# Patient Record
Sex: FEMALE | Race: WHITE | HISPANIC OR LATINO | URBAN - METROPOLITAN AREA
[De-identification: names, ages, dates, MRNs, and addresses within clinical notes are randomized per-mention and may not be internally consistent; named-entity substitution may affect disease eponyms.]

---

## 2017-08-03 ENCOUNTER — OFFICE (OUTPATIENT)
Dept: URBAN - METROPOLITAN AREA CLINIC 78 | Facility: CLINIC | Age: 57
End: 2017-08-03
Payer: COMMERCIAL

## 2017-08-03 VITALS
HEIGHT: 59 IN | SYSTOLIC BLOOD PRESSURE: 108 MMHG | TEMPERATURE: 99.3 F | WEIGHT: 133 LBS | DIASTOLIC BLOOD PRESSURE: 74 MMHG | HEART RATE: 95 BPM

## 2017-08-03 DIAGNOSIS — K21.9 GASTRO-ESOPHAGEAL REFLUX DISEASE WITHOUT ESOPHAGITIS: ICD-10-CM

## 2017-08-03 DIAGNOSIS — K31.84 GASTROPARESIS: ICD-10-CM

## 2017-08-03 PROCEDURE — 99213 OFFICE O/P EST LOW 20 MIN: CPT

## 2017-08-03 RX ORDER — ESOMEPRAZOLE MAGNESIUM 40 MG/1
CAPSULE, DELAYED RELEASE ORAL
Qty: 180 | Refills: 3 | Status: COMPLETED
End: 2019-07-31

## 2017-08-03 NOTE — SERVICEHPINOTES
58 yo female presents for f/u GERD and gastroparesis. Here for refill of her Nexium. She is s/p antireflux surgery several years ago with complications and since that time has had gastroparesis and severe GERD. She reports that she has no LES function and that if she doesn't stay upright after eating/drinking she will have regurg and aspiration. She uses Nexium BID and Zantac qhs though found that Zantac seemed to make her cough so is wondering about another option. She does note that she has joined [employer] and is losing some weight and subsequently has had improvement in GERD symptoms. Last year she had positive Cologuard and then completed an EGD/colonoscopy with overall benign findings. Next colonoscopy was advised for 5 years. She actually did not remember today that she had the colonoscopy - had previously wanted to avoid this for fear of prep as well as aspiration risk.

## 2018-05-25 ENCOUNTER — OFFICE (OUTPATIENT)
Dept: URBAN - METROPOLITAN AREA CLINIC 78 | Facility: CLINIC | Age: 58
End: 2018-05-25
Payer: COMMERCIAL

## 2018-05-25 VITALS
HEIGHT: 59 IN | HEART RATE: 87 BPM | SYSTOLIC BLOOD PRESSURE: 130 MMHG | TEMPERATURE: 98.2 F | DIASTOLIC BLOOD PRESSURE: 98 MMHG | WEIGHT: 131 LBS

## 2018-05-25 DIAGNOSIS — K31.84 GASTROPARESIS: ICD-10-CM

## 2018-05-25 DIAGNOSIS — K21.9 GASTRO-ESOPHAGEAL REFLUX DISEASE WITHOUT ESOPHAGITIS: ICD-10-CM

## 2018-05-25 DIAGNOSIS — R19.6 HALITOSIS: ICD-10-CM

## 2018-05-25 PROCEDURE — 99214 OFFICE O/P EST MOD 30 MIN: CPT

## 2018-05-25 NOTE — SERVICEHPINOTES
57 yo female with h/o GERD and gastroparesis presents with complaint of intermittent bad breath. She started noticing it 2-3 months ago. She denies change in dental health though just saw her dentist today to get a filling replaced. Will be going back again next week and will mention the bad breath issue. She has been eating healthier diet and reports improved sleep. Denies any change in GI symptoms, has been doing well overall, though does have gas issues. Patient is s/p antireflux surgery several years ago with complications and since that time has had gastroparesis and severe GERD. She reports that she has no LES function and that if she doesn't stay upright after eating/drinking she will have regurg and aspiration. She uses Nexium BID and Zantac qhs. She is having regular BM with use of a probiotic. In 2016 she had positive Cologuard and then completed an EGD/colonoscopy with overall benign findings. Next colonoscopy was advised for 5 years.

## 2019-01-30 ENCOUNTER — OFFICE (OUTPATIENT)
Dept: URBAN - METROPOLITAN AREA CLINIC 78 | Facility: CLINIC | Age: 59
End: 2019-01-30

## 2019-01-30 VITALS
HEIGHT: 59 IN | TEMPERATURE: 97.9 F | HEART RATE: 73 BPM | WEIGHT: 143 LBS | SYSTOLIC BLOOD PRESSURE: 116 MMHG | DIASTOLIC BLOOD PRESSURE: 79 MMHG

## 2019-01-30 DIAGNOSIS — K31.84 GASTROPARESIS: ICD-10-CM

## 2019-01-30 DIAGNOSIS — K21.9 GASTRO-ESOPHAGEAL REFLUX DISEASE WITHOUT ESOPHAGITIS: ICD-10-CM

## 2019-01-30 PROCEDURE — 99213 OFFICE O/P EST LOW 20 MIN: CPT

## 2019-01-30 NOTE — SERVICEHPINOTES
57 yo female presents for f/u GERD and gastroparesis. She is s/p antireflux surgery several years ago with complications and since that time has had gastroparesis and severe GERD. She reports that she has no LES function and that if she doesn't stay upright after eating/drinking she will have regurg and aspiration. She uses Nexium BID and Zantac 300 mg qhs (though she switches this up sometimes as it seems to cause a cough). She notes that she is currently doing very well on this regimen but had recent insurance denial of the BID PPI dosing. A prior auth is underway. She has previously tried and failed other PPIs, including Dexilant. She has no other concerns today. Last EGD was in May 2016 showing mild esophagitis, mild gastritis, and small hiatal hernia. Duodenal and stomach biopsies were benign.  In 2016 she had positive Cologuard and then completed a colonoscopy with overall benign findings. Next colonoscopy was advised for 2021.

## 2019-07-31 ENCOUNTER — OFFICE (OUTPATIENT)
Dept: URBAN - METROPOLITAN AREA CLINIC 78 | Facility: CLINIC | Age: 59
End: 2019-07-31
Payer: COMMERCIAL

## 2019-07-31 VITALS
HEIGHT: 59 IN | TEMPERATURE: 98.5 F | DIASTOLIC BLOOD PRESSURE: 80 MMHG | HEART RATE: 75 BPM | SYSTOLIC BLOOD PRESSURE: 122 MMHG | WEIGHT: 149 LBS

## 2019-07-31 DIAGNOSIS — K21.9 GASTRO-ESOPHAGEAL REFLUX DISEASE WITHOUT ESOPHAGITIS: ICD-10-CM

## 2019-07-31 DIAGNOSIS — K31.84 GASTROPARESIS: ICD-10-CM

## 2019-07-31 PROCEDURE — 99214 OFFICE O/P EST MOD 30 MIN: CPT

## 2019-07-31 RX ORDER — BACLOFEN 10 MG/1
TABLET ORAL
Qty: 120 | Refills: 5 | Status: COMPLETED
End: 2020-04-01

## 2019-07-31 NOTE — SERVICEHPINOTES
58 yo female with h/o GERD and gastroparesis presents to discuss some medication concerns. She is s/p antireflux surgery several years ago with complications and since that time has had gastroparesis and severe GERD. She reports that she has no LES function and that if she doesn't stay upright after eating/drinking she will have regurg and aspiration. She uses Nexium BID and Zantac 300 mg qhs (though she switches this up sometimes as it seems to cause a cough). Has done well with symptom control on this regimen, but she notes recent foot fracture, very low vitamin D level, and anxiety issues that have not responded to multiple trials of psych meds. She is wondering if her acid-blocking meds might be contributing to some of these issues. She has previously tried and failed other PPIs, including Dexilant. She has no other concerns today. Last EGD was in May 2016 showing mild esophagitis, mild gastritis, and small hiatal hernia. Duodenal and stomach biopsies were benign. In 2016 she had positive Cologuard and then completed a colonoscopy with overall benign findings. Next colonoscopy was advised for 2021.

## 2020-04-01 ENCOUNTER — OFFICE (OUTPATIENT)
Dept: URBAN - METROPOLITAN AREA CLINIC 79 | Facility: CLINIC | Age: 60
End: 2020-04-01
Payer: COMMERCIAL

## 2020-04-01 VITALS — HEIGHT: 59 IN

## 2020-04-01 DIAGNOSIS — K21.9 GASTRO-ESOPHAGEAL REFLUX DISEASE WITHOUT ESOPHAGITIS: ICD-10-CM

## 2020-04-01 DIAGNOSIS — K31.84 GASTROPARESIS: ICD-10-CM

## 2020-04-01 PROCEDURE — 99214 OFFICE O/P EST MOD 30 MIN: CPT | Mod: GQ | Performed by: PHYSICIAN ASSISTANT

## 2020-04-01 RX ORDER — FAMOTIDINE 40 MG/1
TABLET, FILM COATED ORAL
Qty: 90 | Refills: 3 | Status: COMPLETED
Start: 2020-04-01 | End: 2020-08-20

## 2020-04-01 RX ORDER — ESOMEPRAZOLE MAGNESIUM 40 MG/1
CAPSULE, DELAYED RELEASE ORAL
Qty: 180 | Refills: 3 | Status: COMPLETED
End: 2020-11-25

## 2020-04-01 NOTE — SERVICEHPINOTES
PATIENT VERIFIED BY DATE OF BIRTH AND NAME. Patient has been consented for this telecommunication visit. 60 yo female with h/o GERD and gastroparesis presents for follow-up. She is s/p antireflux surgery several years ago with complications and since that time has had gastroparesis and severe GERD. She reports that she has no LES function and that if she doesn't stay upright after eating/drinking she will have regurg and aspiration. She uses Nexium 40 mg BID and Zantac 300 mg qhs. She had to stop Zantac due to the recall and hasn't found anything else to work as well. If she takes less medication, she gets heartburn and reflux. She is aware of goal of lowest effective dose and does have days with lower stress that she feels able to just use one Nexium. She has previously tried and failed other PPIs, including Dexilant. Last year she also tried baclofen but this ended up causing more vomiting for her. She has no other concerns today. Last EGD was in May 2016 showing mild esophagitis, mild gastritis, and small hiatal hernia. Duodenal and stomach biopsies were benign. In 2016 she had positive Cologuard and then completed a colonoscopy with overall benign findings. Next colonoscopy was advised for 2021.ROS today is positive only for reflux, and her chronic anxiety and depression. Otherwise negative.

## 2020-08-20 ENCOUNTER — OFFICE (OUTPATIENT)
Dept: URBAN - METROPOLITAN AREA TELEHEALTH 7 | Facility: TELEHEALTH | Age: 60
End: 2020-08-20
Payer: COMMERCIAL

## 2020-08-20 VITALS — HEIGHT: 59 IN | WEIGHT: 164 LBS

## 2020-08-20 DIAGNOSIS — K31.84 GASTROPARESIS: ICD-10-CM

## 2020-08-20 DIAGNOSIS — K21.9 GASTRO-ESOPHAGEAL REFLUX DISEASE WITHOUT ESOPHAGITIS: ICD-10-CM

## 2020-08-20 PROCEDURE — 99214 OFFICE O/P EST MOD 30 MIN: CPT | Mod: GQ | Performed by: PHYSICIAN ASSISTANT

## 2020-08-20 NOTE — SERVICEHPINOTES
PATIENT VERIFIED BY DATE OF BIRTH AND NAME. Patient has been consented for this telecommunication visit. 59 yo female with h/o GERD and gastroparesis presents with ongoing reflux and regurgitation symptoms. Her  is with her. She is s/p antireflux surgery several years ago with complications and since that time has had gastroparesis and severe GERD. She reports that she has no LES function and that if she doesn't stay upright after eating/drinking she will have regurg and aspiration. She recently has been having vomiting and has regurg happening at night. Can happen without much warning during the day as well. She uses Nexium 40 mg BID and Pepcid 40 mg qhs (previously on Zantac 300mg which worked better for her). Quality of life has been much worse in the past few months and she is unable to work right now due to symptoms and this is increasing financial stress for her. Has been struggling with anxiety and depression (seeing someone for that).   She has previously tried and failed other PPIs, including Dexilant. Last year she also tried baclofen but this ended up causing more vomiting for her. She has no other concerns today. Last EGD was in May 2016 showing mild esophagitis, mild gastritis, and small hiatal hernia. Duodenal and stomach biopsies were benign. In 2016 she had positive Cologuard and then completed a colonoscopy with overall benign findings. Next colonoscopy was advised for 2021. She reports regular bowel habits - no constipation.ROS as above, otherwise negative.

## 2020-11-25 ENCOUNTER — OFFICE (OUTPATIENT)
Dept: URBAN - METROPOLITAN AREA TELEHEALTH 3 | Facility: TELEHEALTH | Age: 60
End: 2020-11-25
Payer: COMMERCIAL

## 2020-11-25 VITALS — HEIGHT: 59 IN | WEIGHT: 116 LBS

## 2020-11-25 DIAGNOSIS — K59.09 OTHER CONSTIPATION: ICD-10-CM

## 2020-11-25 DIAGNOSIS — K21.9 GASTRO-ESOPHAGEAL REFLUX DISEASE WITHOUT ESOPHAGITIS: ICD-10-CM

## 2020-11-25 DIAGNOSIS — K31.84 GASTROPARESIS: ICD-10-CM

## 2020-11-25 PROCEDURE — 99214 OFFICE O/P EST MOD 30 MIN: CPT | Mod: GQ | Performed by: INTERNAL MEDICINE

## 2020-11-25 RX ORDER — PRUCALOPRIDE 1 MG/1
1 TABLET, FILM COATED ORAL
Qty: 30 | Refills: 3 | Status: ACTIVE
Start: 2020-11-25

## 2020-11-25 NOTE — SERVICEHPINOTES
PATIENT VERIFIED BY DATE OF BIRTH AND NAME. Patient has been consented for this telecommunication visit.